# Patient Record
Sex: MALE | Race: BLACK OR AFRICAN AMERICAN | NOT HISPANIC OR LATINO | Employment: UNEMPLOYED | URBAN - METROPOLITAN AREA
[De-identification: names, ages, dates, MRNs, and addresses within clinical notes are randomized per-mention and may not be internally consistent; named-entity substitution may affect disease eponyms.]

---

## 2023-10-31 ENCOUNTER — HOSPITAL ENCOUNTER (EMERGENCY)
Facility: HOSPITAL | Age: 23
Discharge: HOME | End: 2023-10-31
Attending: EMERGENCY MEDICINE
Payer: MEDICAID

## 2023-10-31 VITALS
BODY MASS INDEX: 18.64 KG/M2 | HEIGHT: 66 IN | HEART RATE: 60 BPM | DIASTOLIC BLOOD PRESSURE: 71 MMHG | SYSTOLIC BLOOD PRESSURE: 139 MMHG | TEMPERATURE: 98 F | OXYGEN SATURATION: 99 % | RESPIRATION RATE: 18 BRPM | WEIGHT: 116 LBS

## 2023-10-31 DIAGNOSIS — K08.89 PAIN, DENTAL: Primary | ICD-10-CM

## 2023-10-31 DIAGNOSIS — K04.01 ACUTE PULPITIS: ICD-10-CM

## 2023-10-31 PROCEDURE — 99283 EMERGENCY DEPT VISIT LOW MDM: CPT

## 2023-10-31 RX ORDER — AMOXICILLIN 875 MG/1
875 TABLET, FILM COATED ORAL 2 TIMES DAILY
Qty: 14 TABLET | Refills: 0 | Status: SHIPPED | OUTPATIENT
Start: 2023-10-31 | End: 2024-01-26

## 2023-10-31 NOTE — ED PROVIDER NOTES
Encounter Date: 10/31/2023       History     Chief Complaint   Patient presents with    Oral Swelling     Pain and swelling to the right upper and lower gums with pain into the jaw and right side of the face. Has been an on going issue for years nut symptoms have become more severe over the last 4 days.      23-year-old male with no significant medical history presents to the ED with dental pain.  Patient reports pain to right-sided upper molars that significantly worsened yesterday. He prefers to use natural remedies, but reports clove is not helping. He also tried ibuprofen with no relief of pain. He reports last dental visit about 6 months ago where root canal was recommended, but he is unable to afford the procedure at this time.  He denies any facial swelling, fever, nausea, vomiting.  He is tolerating his secretions.  No difficulty breathing. He did have headache earlier today that has resolved.    The history is provided by the patient.     Review of patient's allergies indicates:  No Known Allergies  No past medical history on file.  No past surgical history on file.  No family history on file.     Review of Systems   Constitutional:  Negative for chills and fever.   HENT:  Positive for dental problem. Negative for ear pain, facial swelling and sinus pain.    Respiratory:  Negative for shortness of breath.    Cardiovascular:  Negative for chest pain.   Skin:  Negative for color change.   Psychiatric/Behavioral:  Negative for agitation and confusion.        Physical Exam     Initial Vitals [10/31/23 1324]   BP Pulse Resp Temp SpO2   139/71 60 18 98.1 °F (36.7 °C) 99 %      MAP       --         Physical Exam    Nursing note and vitals reviewed.  Constitutional: He appears well-developed and well-nourished. He is not diaphoretic.  Non-toxic appearance. No distress.   HENT:   Head: Normocephalic and atraumatic.   Right Ear: Hearing, tympanic membrane, external ear and ear canal normal. Tympanic membrane is not  erythematous and not retracted.   Left Ear: Hearing, tympanic membrane, external ear and ear canal normal. Tympanic membrane is not erythematous and not retracted.   Nose: Nose normal.   Mouth/Throat: Uvula is midline. Dental caries present. No posterior oropharyngeal edema or posterior oropharyngeal erythema.   Upper right molars TTP. Some tooth decay noted. Surrounding gingival inflammation. No visible/palpable abscess/papule. No facial swelling. No induration or crepitus.   Eyes: EOM are normal.   Neck: Neck supple.   Normal range of motion.  Cardiovascular:  Normal rate.           Pulmonary/Chest: No respiratory distress.   Abdominal: He exhibits no distension.   Musculoskeletal:         General: Normal range of motion.      Cervical back: Normal range of motion and neck supple.     Neurological: He is alert and oriented to person, place, and time. GCS score is 15. GCS eye subscore is 4. GCS verbal subscore is 5. GCS motor subscore is 6.   Skin: Skin is dry.   Psychiatric: He has a normal mood and affect. His behavior is normal. Judgment and thought content normal.         ED Course   Procedures  Labs Reviewed - No data to display       Imaging Results    None          Medications - No data to display  Medical Decision Making  24 yo male with upper right dental pain for two days. No severe headache. No facial swelling or fever. Patient well appearing with normal vital signs. Upper right molars TTP, with surrounding gingival inflammation.     Differential Diagnosis includes, but is not limited to:  Randell's angina, acute necrotizing ulcerative gingivitis, epiglottitis, parotitis, gingival abscess, facial cellulitis, peritonsillar/retropharyngeal abscess, sialolithiasis, periapical abscess, pulpitis, dental fracture, dental caries, aphthous ulcer.    Concern for dental infection, though could represent simple dental carry. Antibiotics sent to pharmacy. Pain control rotating ibuprofen and tylenol. Afebrile and well  appearing. No visible/palpable dental abscess or fluctuance. No evidence of deep space infection, Randell's, mastoiditis, ANUG. No facial swelling, trismus or airway involvement. Follow up with dentist. ED return precautions discussed. All questions answered.                                 Clinical Impression:   Final diagnoses:  [K08.89] Pain, dental (Primary)  [K04.01] Acute pulpitis        ED Disposition Condition    Discharge Stable          ED Prescriptions       Medication Sig Dispense Start Date End Date Auth. Provider    amoxicillin (AMOXIL) 875 MG tablet Take 1 tablet (875 mg total) by mouth 2 (two) times daily. 14 tablet 10/31/2023 -- Rachel Hampton PA-C          Follow-up Information       Follow up With Specialties Details Why Contact Info    Dentist  Schedule an appointment as soon as possible for a visit in 1 week               Rachel Hampton PA-C  10/31/23 8575

## 2023-10-31 NOTE — DISCHARGE INSTRUCTIONS
Call your dentist for a follow up appointment. Rotate tylenol and ibuprofen for pain.     Start taking antibiotics as prescribed, and continue taking medication until the entire prescription has been completed.  Avoid using drugs/alcohol while on antibiotics, and for the next 72 hours after finishing the prescription.  Obtain an appointment or return to the ED for any symptoms of worsening infection, including fever, nausea/vomiting or inability to take the medication, antibiotic side effects, allergic reaction, or any other concerns.

## 2023-10-31 NOTE — ED NOTES
Bed: EXAM 18  Expected date:   Expected time:   Means of arrival:   Comments:  Gurpreet after intake

## 2023-10-31 NOTE — Clinical Note
"Ratna Malin" Gurpreet was seen and treated in our emergency department on 10/31/2023.  He may return to work on 11/02/2023.       If you have any questions or concerns, please don't hesitate to call.      Ashwin Melgoza LPN    "

## 2023-10-31 NOTE — ED TRIAGE NOTES
Chronic dental pain x 2 years. States seen by dentist a couple of months ago - was told that he needed a lot of work done but insurance doesn't cover it all. States he is having to get a little done at a time. Presents today with worsening pain in right upper and lower teeth with difficulty chewing and swelling to right side of face x 3-4 days. Denies fever.

## 2023-10-31 NOTE — ED NOTES
Pt states that he is having pain to the right side of his jaw, hx of dental issues. Pt states unable to sleep last night due to pain and is using some home remedies to help with pain, states some relief but not completely. Pt states that his insurance will not cover his dental work right now, states here for relief of pain

## 2024-01-26 ENCOUNTER — OFFICE VISIT (OUTPATIENT)
Dept: URGENT CARE | Facility: CLINIC | Age: 24
End: 2024-01-26
Payer: MEDICAID

## 2024-01-26 VITALS
OXYGEN SATURATION: 98 % | HEIGHT: 66 IN | DIASTOLIC BLOOD PRESSURE: 75 MMHG | BODY MASS INDEX: 18.64 KG/M2 | RESPIRATION RATE: 17 BRPM | TEMPERATURE: 98 F | SYSTOLIC BLOOD PRESSURE: 112 MMHG | WEIGHT: 116 LBS

## 2024-01-26 DIAGNOSIS — K04.7 DENTAL INFECTION: ICD-10-CM

## 2024-01-26 DIAGNOSIS — K08.89 PAIN, DENTAL: Primary | ICD-10-CM

## 2024-01-26 PROCEDURE — 99203 OFFICE O/P NEW LOW 30 MIN: CPT | Mod: S$GLB,,, | Performed by: NURSE PRACTITIONER

## 2024-01-26 RX ORDER — IBUPROFEN 600 MG/1
600 TABLET ORAL EVERY 8 HOURS PRN
Qty: 20 TABLET | Refills: 0 | Status: SHIPPED | OUTPATIENT
Start: 2024-01-26

## 2024-01-26 RX ORDER — AMOXICILLIN AND CLAVULANATE POTASSIUM 875; 125 MG/1; MG/1
1 TABLET, FILM COATED ORAL EVERY 12 HOURS
Qty: 14 TABLET | Refills: 0 | Status: SHIPPED | OUTPATIENT
Start: 2024-01-26 | End: 2024-02-02

## 2024-01-26 NOTE — PROGRESS NOTES
"Subjective:      Patient ID: Ratna Vázquez is a 23 y.o. male.    Vitals:  height is 5' 6" (1.676 m) and weight is 52.6 kg (116 lb). His temperature is 98.4 °F (36.9 °C). His blood pressure is 112/75. His respiration is 17 and oxygen saturation is 98%.     Chief Complaint: Dental Problem    Patient presents to the clinic with right side dental pain x under  year. Patient needs some type of relief from the pain until he can afford the procedure he needs.   Provider note below:  This is a 23 y.o. male who presents today with a chief complaint of right upper dental pain that started yesterday, patient reports he needs to have them pulled out as recommended by his dentist but can not afford the procedure as his insurance does not cover if he needs Cap, denies any active drainage or discharge,  denies fever, body aches or chills, denies cough, wheezing or shortness of breath, denies nausea, vomiting, diarrhea or abdominal pain, denies chest pain or dizziness positional lightheadedness, denies sore throat or trouble swallowing, denies loss of taste or smell, or any other symptoms       Dental Pain   This is a new problem. The current episode started more than 1 month ago. The problem occurs constantly. The problem has been gradually worsening. The pain is at a severity of 9/10. The pain is severe. Associated symptoms include difficulty swallowing, facial pain and thermal sensitivity. Pertinent negatives include no sinus pressure. He has tried nothing for the symptoms. The treatment provided no relief.       HENT:  Positive for dental problem. Negative for sinus pressure.       Objective:     Physical Exam   Constitutional: He is oriented to person, place, and time. He appears well-developed. He is cooperative.  Non-toxic appearance. He does not appear ill. No distress.   HENT:   Head: Normocephalic and atraumatic.   Ears:   Right Ear: Hearing, tympanic membrane, external ear and ear canal normal.   Left Ear: Hearing, " tympanic membrane, external ear and ear canal normal.   Nose: Nose normal. No mucosal edema, rhinorrhea or nasal deformity. No epistaxis. Right sinus exhibits no maxillary sinus tenderness and no frontal sinus tenderness. Left sinus exhibits no maxillary sinus tenderness and no frontal sinus tenderness.   Mouth/Throat: Uvula is midline, oropharynx is clear and moist and mucous membranes are normal. No trismus in the jaw. Normal dentition. No uvula swelling. No oropharyngeal exudate, posterior oropharyngeal edema, posterior oropharyngeal erythema, tonsillar abscesses or cobblestoning.       No visible abscess noted, erythema and tenderness noted to point of pain, no active drainage or discharge noted, no oropharyngeal erythema, edema or exudate noted, uvula midline, no Randell's or trismus      Comments: No visible abscess noted, erythema and tenderness noted to point of pain, no active drainage or discharge noted, no oropharyngeal erythema, edema or exudate noted, uvula midline, no Randell's or trismus  Eyes: Conjunctivae and lids are normal. No scleral icterus.   Neck: Trachea normal and phonation normal. Neck supple. No edema present. No erythema present. No neck rigidity present.   Cardiovascular: Normal rate, regular rhythm, normal heart sounds and normal pulses.   Pulmonary/Chest: Effort normal and breath sounds normal. No respiratory distress. He has no decreased breath sounds. He has no rhonchi.   Abdominal: Normal appearance.   Musculoskeletal: Normal range of motion.         General: No deformity. Normal range of motion.   Neurological: He is alert and oriented to person, place, and time. He exhibits normal muscle tone. Coordination normal.   Skin: Skin is warm, dry, intact, not diaphoretic and not pale.   Psychiatric: His speech is normal and behavior is normal. Judgment and thought content normal.   Nursing note and vitals reviewed.        Patient in no acute distress.  Vitals reassuring.  Discussed  results/diagnosis/plan in depth with patient in clinic. Strict precautions given to patient to monitor for worsening signs and symptoms. Advised to follow up with primary.All questions answered. Strict ER precautions given. If your symptoms worsens or fail to improve you should go to the Emergency Room. Discharge and follow-up instructions given verbally/printed. Discharge and follow-up instructions discussed with the patient who expressed understanding and willingness to comply with my recommendations.Patient voiced understanding and in agreement with current treatment plan.     Please be advised this text was dictated with Unii software and may contain errors due to translation.   Assessment:     1. Pain, dental    2. Dental infection        Plan:       Pain, dental  -     ibuprofen (ADVIL,MOTRIN) 600 MG tablet; Take 1 tablet (600 mg total) by mouth every 8 (eight) hours as needed for Pain.  Dispense: 20 tablet; Refill: 0    Dental infection  -     amoxicillin-clavulanate 875-125mg (AUGMENTIN) 875-125 mg per tablet; Take 1 tablet by mouth every 12 (twelve) hours. for 7 days  Dispense: 14 tablet; Refill: 0                Patient Instructions   PLEASE READ YOUR DISCHARGE INSTRUCTIONS ENTIRELY AS IT CONTAINS IMPORTANT INFORMATION.    Use the mouthwash twice daily    Take the antibiotics to completion    Please see a dentist ASAP for further treatment as the infection can spread to your jaw bone. Ideally in 24-48 hours.    Please return or see your primary care doctor if you develop new or worsening symptoms.     You must understand that you have received an Urgent Care treatment only and that you may be released before all of your medical problems are known or treated.

## 2024-09-27 ENCOUNTER — HOSPITAL ENCOUNTER (EMERGENCY)
Facility: HOSPITAL | Age: 24
Discharge: HOME | End: 2024-09-27
Attending: EMERGENCY MEDICINE

## 2024-09-27 VITALS
DIASTOLIC BLOOD PRESSURE: 60 MMHG | TEMPERATURE: 98 F | WEIGHT: 110 LBS | OXYGEN SATURATION: 98 % | HEIGHT: 66 IN | BODY MASS INDEX: 17.68 KG/M2 | HEART RATE: 79 BPM | RESPIRATION RATE: 16 BRPM | SYSTOLIC BLOOD PRESSURE: 114 MMHG

## 2024-09-27 DIAGNOSIS — K08.89 PAIN, DENTAL: Primary | ICD-10-CM

## 2024-09-27 PROCEDURE — 99284 EMERGENCY DEPT VISIT MOD MDM: CPT

## 2024-09-27 RX ORDER — NAPROXEN 500 MG/1
500 TABLET ORAL
Status: DISCONTINUED | OUTPATIENT
Start: 2024-09-27 | End: 2024-09-27

## 2024-09-27 RX ORDER — NAPROXEN 500 MG/1
500 TABLET ORAL 2 TIMES DAILY
Qty: 60 TABLET | Refills: 0 | Status: SHIPPED | OUTPATIENT
Start: 2024-09-27

## 2024-09-27 RX ORDER — ACETAMINOPHEN 500 MG
1000 TABLET ORAL
Status: DISCONTINUED | OUTPATIENT
Start: 2024-09-27 | End: 2024-09-27 | Stop reason: HOSPADM

## 2024-09-27 RX ORDER — AMOXICILLIN AND CLAVULANATE POTASSIUM 875; 125 MG/1; MG/1
1 TABLET, FILM COATED ORAL 2 TIMES DAILY
Qty: 14 TABLET | Refills: 0 | Status: SHIPPED | OUTPATIENT
Start: 2024-09-27

## 2024-09-27 NOTE — ED PROVIDER NOTES
Encounter Date: 9/27/2024       History     Chief Complaint   Patient presents with    Dental Pain     Patient reports to the ED complaining of left sided dental pain ongoing for the past few weeks. Patient states pain worsened last night. Patient states he took 600mg Ibuprofen PTA. Ambulatory to triage, NAD noted.     24-year-old male with no significant past medical history on file presents to the ED with worsening dental pain with past few days. States he cracked his tooth while eating few weeks ago. Pain is localized to the left upper mandible. Patient denies drooling, trismus, difficulty breathing or swallowing.  Patient denies neck pain, chills, fever.  Had a dose of ibuprofen last night for pain with minimal improvement of his symptoms.  Has not been evaluated by a dentist. States he tried to get dental work couple months ago but had financial problems at the time.  No shortness breath, nausea vomiting.  No other acute complaints today.    The history is provided by the patient.     Review of patient's allergies indicates:  No Known Allergies  No past medical history on file.  No past surgical history on file.  No family history on file.  Social History     Tobacco Use    Smoking status: Never    Smokeless tobacco: Never     Review of Systems   Constitutional:  Negative for chills and fever.   HENT:  Positive for dental problem. Negative for sore throat, trouble swallowing and voice change.    Respiratory:  Negative for shortness of breath.    Cardiovascular:  Negative for chest pain.   Gastrointestinal:  Negative for abdominal distention, abdominal pain, nausea and vomiting.   Musculoskeletal:  Negative for neck pain and neck stiffness.       Physical Exam     Initial Vitals [09/27/24 1324]   BP Pulse Resp Temp SpO2   114/60 79 16 98.3 °F (36.8 °C) 98 %      MAP       --         Physical Exam    Vitals reviewed.  Constitutional: He appears well-developed and well-nourished. He is not diaphoretic. No  distress.   HENT:   Head: Normocephalic and atraumatic.   Right Ear: External ear normal.   Left Ear: External ear normal.   Mouth/Throat: Oropharynx is clear and moist.       Dental: dentition is poor, no sig trismus, floor of the mouth is soft, submandibular area shows no sign's of Randell's angina/spreading infection. No mild soft tissue swelling and induration noted on the left mandible. No overlying cellulitic change or crepitus appreciated on palpation;  No changes in the voice. No uvula deviation or uvulitis. No compromise of his airway.      Eyes: EOM are normal.   Neck: Neck supple.   Normal range of motion.  Cardiovascular:  Normal rate and normal heart sounds.           Pulmonary/Chest: Breath sounds normal. No respiratory distress. He has no wheezes.   Abdominal: Abdomen is soft. Bowel sounds are normal. He exhibits no distension. There is no abdominal tenderness.   Musculoskeletal:      Cervical back: Normal range of motion and neck supple.     Neurological: He is alert and oriented to person, place, and time. GCS score is 15. GCS eye subscore is 4. GCS verbal subscore is 5. GCS motor subscore is 6.   Skin: Skin is warm. Capillary refill takes less than 2 seconds.   Psychiatric: He has a normal mood and affect. His behavior is normal. Judgment and thought content normal.         ED Course   Procedures  Labs Reviewed - No data to display       Imaging Results    None          Medications   acetaminophen tablet 1,000 mg (1,000 mg Oral Not Given 9/27/24 1345)     Medical Decision Making  Differential Diagnosis includes, but is not limited to:  Randell's angina, acute necrotizing ulcerative gingivitis, epiglottitis, parotitis, gingival abscess, facial cellulitis, peritonsillar/retropharyngeal abscess, sialolithiasis, periapical abscess, pulpitis, dental fracture, dental caries, aphthous ulcer.    ED management     24-year-old male with no significant past medical history on file presents to the ED with dental  pain worsening x few weeks.  Patient is not toxic appearing, hemodynamically stable and resting comfortably on bed. Patient is well-appearing.  Awake and alert.  Afebrile with vitals WNL. No distress on exam. On physical exam, dentition is poor, no sig trismus, floor of the mouth is soft, submandibular area shows no sign's of Randell's angina/spreading infection. No obvious overt trauma.  The dental area is tender to chewing.  There is no difficulty speaking, breathing, swallowing.  There has been no recent dental follow up. Based upon the history and physical I see no signs of sublingual swelling, airway compromise, sepsis, or a fluctuant abscess to drain. I believe the patient can be discharged home with Naproxen needed for pain. Will send Rx Augmentin for dental infection prophylaxis.  Encouraged use of warm compresses to alleviate pain and inflammation. Patient should follow-up with [facility] for further evaluation.  Dental resources provided at bedside.  Patient stable at discharge.     I have discussed the specifics of the workup with the patient and the patient has verbalized understanding of the details of the workup, the diagnosis, the treatment plan, and the need for outpatient follow-up with PCP. ED precautions given. Discussed with pt about returning to the ED, if symptoms fail to improve or worsen.     RESULTS:  Documented in ED course.   Labs/ekg interpreted by myself       Voice recognition software utilized in this note. Typographical and content errors may occur with this process. While efforts are made to detect and correct such errors, in some cases errors will persist. For this reason, wording in this document should be considered in the proper context and not strictly verbatim.                      Risk  OTC drugs.  Prescription drug management.                                      Clinical Impression:  Final diagnoses:  [K08.89] Pain, dental (Primary)          ED Disposition Condition    Discharge            ED Prescriptions       Medication Sig Dispense Start Date End Date Auth. Provider    naproxen (NAPROSYN) 500 MG tablet Take 1 tablet (500 mg total) by mouth 2 (two) times daily. 60 tablet 9/27/2024 -- Marcia Gresham PA-C    amoxicillin-clavulanate 875-125mg (AUGMENTIN) 875-125 mg per tablet Take 1 tablet by mouth 2 (two) times daily. 14 tablet 9/27/2024 -- Marcia Gresham PA-C          Follow-up Information       Follow up With Specialties Details Why Contact Info    your dentist  Schedule an appointment as soon as possible for a visit in 3 days As needed, If symptoms worsen              Marcia Gresham PA-C  09/27/24 1429       Marcia Gresham PA-C  09/27/24 1421

## 2024-09-27 NOTE — DISCHARGE INSTRUCTIONS
Mr Vázquez,     Thank you for letting me care for you today! It was nice meeting you, and I hope you feel better soon.   If you would like access to your chart and what was done today please utilize the [facility].   Please don't hesitate to return if your symptoms worsen or you develop any other worrisome symptoms.    Our goal in the emergency department is to always give you outstanding care and exceptional service. You may receive a survey by mail or e-mail in the next week regarding your experience in our ED. We would greatly appreciate you completing and returning the survey. Your feedback provides us with a way to recognize our staff who give very good care and it helps us learn how to improve when your experience was below our aspiration of excellence.     Sincerely,    Marcia Gresham PA-C  Emergency Department Physician Assistant  [facility], [place], and [place]

## 2024-09-27 NOTE — ED TRIAGE NOTES
Patient states that he needs a lot of dental care that he cannot afford. Has been having ongoing dental pain for weeks. Denies fever. Presents in no distress.

## 2025-02-04 ENCOUNTER — HOSPITAL ENCOUNTER (EMERGENCY)
Facility: HOSPITAL | Age: 25
Discharge: HOME | End: 2025-02-04
Attending: EMERGENCY MEDICINE

## 2025-02-04 VITALS
BODY MASS INDEX: 17.68 KG/M2 | DIASTOLIC BLOOD PRESSURE: 74 MMHG | TEMPERATURE: 98 F | HEART RATE: 62 BPM | RESPIRATION RATE: 17 BRPM | HEIGHT: 66 IN | WEIGHT: 110 LBS | OXYGEN SATURATION: 99 % | SYSTOLIC BLOOD PRESSURE: 122 MMHG

## 2025-02-04 DIAGNOSIS — K04.7 DENTAL ABSCESS: Primary | ICD-10-CM

## 2025-02-04 PROCEDURE — 99283 EMERGENCY DEPT VISIT LOW MDM: CPT

## 2025-02-04 RX ORDER — AMOXICILLIN AND CLAVULANATE POTASSIUM 875; 125 MG/1; MG/1
1 TABLET, FILM COATED ORAL 2 TIMES DAILY
Qty: 20 TABLET | Refills: 0 | Status: SHIPPED | OUTPATIENT
Start: 2025-02-04 | End: 2025-02-14

## 2025-02-04 NOTE — ED PROVIDER NOTES
Encounter Date: 2/4/2025       History     Chief Complaint   Patient presents with    Dental Pain     Pt presents to the ED c/o pain to the L side of his mouth r/t broken tooth.     The patient is a 24-year-old male who came to the emergency department with pain and swelling to his left upper gums.  He has a dental abscess, it was treated in November.  He has not followed up with a dentist.      Review of patient's allergies indicates:  No Known Allergies  History reviewed. No pertinent past medical history.  History reviewed. No pertinent surgical history.  No family history on file.  Social History     Tobacco Use    Smoking status: Never    Smokeless tobacco: Never     Review of Systems   All other systems reviewed and are negative.      Physical Exam     Initial Vitals [02/04/25 0539]   BP Pulse Resp Temp SpO2   122/74 62 17 98.1 °F (36.7 °C) 99 %      MAP       --         Physical Exam    Nursing note and vitals reviewed.  Constitutional: He appears well-developed and well-nourished.   HENT:   Head: Normocephalic and atraumatic.   Swelling to the left buccal cheek and tenderness to percussion of an eroded tooth in the upper premolar and molar     Lymphadenopathy:     He has no cervical adenopathy.         ED Course   Procedures  Labs Reviewed - No data to display       Imaging Results    None          Medications - No data to display  Medical Decision Making  Differential Diagnosis includes, but is not limited to:  Randell's angina, acute necrotizing ulcerative gingivitis, epiglottitis, parotitis, gingival abscess, facial cellulitis, peritonsillar/retropharyngeal abscess, sialolithiasis, periapical abscess, pulpitis, dental fracture, dental caries, aphthous ulcer.     MDM:  The patient is an otherwise healthy 24-year-old male with a dental abscess.  He will be started on Augmentin and he needs to follow up with a dentist in 10-14 days.    Risk  Prescription drug management.                               Pt relative called in and states that she did see dentist in regards to Zometa but she needs to have root canal prior to starting Zometa. They will call back with updates.           Clinical Impression:  Final diagnoses:  [K04.7] Dental abscess (Primary)          ED Disposition Condition    Discharge Stable          ED Prescriptions       Medication Sig Dispense Start Date End Date Auth. Provider    amoxicillin-clavulanate 875-125mg (AUGMENTIN) 875-125 mg per tablet Take 1 tablet by mouth 2 (two) times daily. for 10 days 20 tablet 2/4/2025 2/14/2025 Eva Leong MD          Follow-up Information       Follow up With Specialties Details Why Contact Info    Follow-up with dental clinic or your dentist in 1-2 weeks                 Eva Leong MD  02/04/25 7944

## 2025-07-13 ENCOUNTER — HOSPITAL ENCOUNTER (EMERGENCY)
Facility: HOSPITAL | Age: 25
Discharge: HOME | End: 2025-07-13
Attending: EMERGENCY MEDICINE

## 2025-07-13 VITALS
BODY MASS INDEX: 18.33 KG/M2 | RESPIRATION RATE: 20 BRPM | DIASTOLIC BLOOD PRESSURE: 61 MMHG | OXYGEN SATURATION: 98 % | TEMPERATURE: 99 F | WEIGHT: 110 LBS | SYSTOLIC BLOOD PRESSURE: 112 MMHG | HEART RATE: 73 BPM | HEIGHT: 65 IN

## 2025-07-13 DIAGNOSIS — K02.9 PAIN DUE TO DENTAL CARIES: Primary | ICD-10-CM

## 2025-07-13 PROCEDURE — 25000003 PHARM REV CODE 250

## 2025-07-13 PROCEDURE — 99284 EMERGENCY DEPT VISIT MOD MDM: CPT

## 2025-07-13 RX ORDER — IBUPROFEN 400 MG/1
800 TABLET, FILM COATED ORAL
Status: COMPLETED | OUTPATIENT
Start: 2025-07-13 | End: 2025-07-13

## 2025-07-13 RX ORDER — AMOXICILLIN 500 MG/1
500 CAPSULE ORAL EVERY 8 HOURS
Qty: 9 CAPSULE | Refills: 0 | Status: SHIPPED | OUTPATIENT
Start: 2025-07-13 | End: 2025-07-16

## 2025-07-13 RX ORDER — CHLORHEXIDINE GLUCONATE ORAL RINSE 1.2 MG/ML
15 SOLUTION DENTAL 2 TIMES DAILY
Qty: 473 ML | Refills: 0 | Status: SHIPPED | OUTPATIENT
Start: 2025-07-13 | End: 2025-07-29

## 2025-07-13 RX ADMIN — IBUPROFEN 800 MG: 400 TABLET ORAL at 12:07

## 2025-07-13 NOTE — DISCHARGE INSTRUCTIONS
Thank you for letting me care for you today - it was nice to meet you and I hope you feel better soon. Please follow up with dentist as soon as possible. I provided you a list of dentists.     Over the counter: Rotate between 500-1000mg (acetaminophen) Tylenol and 400-600mg ibuprofen (Motrin), switching every 3 hours. For example, acetaminophen at 8am, ibuprofen at 11am, then tylenol at 2pm.  Do not take more than 3000 mg of Tylenol in 1 day or more than 2400 mg of ibuprofen in 1 day.     I sent in the following medication to your pharmacy:   Amoxicillin: 3 times per day for 3 days  Peridex mouthwash: 15 mL, swish in mouth and spit out  Benzocaine swabs:  Apply 1 to area in mouth that is causing you pain. Can use every 6 hours.     Our goal at [facility] is to always give you outstanding care and exceptional service. You may receive a survey by mail or email in the next week about your experience in our ED. We would greatly appreciate you completing and returning the survey. Your feedback provides us with a way to recognize our staff who give very good care and it helps us learn how to improve when your experience was below our aspiration of excellence.     All the best,     Kate Lopez, MPH, PA-C  Emergency Department Physician Assistant  [facility], [facility], [facility] [place]

## 2025-07-13 NOTE — ED PROVIDER NOTES
"Encounter Date: 7/13/2025       History     Chief Complaint   Patient presents with    Dental Pain     C/o posterior upper L tooth pain x1 year.      25 y.o. male presents with dental pain affecting two of his upper left molars X 1 year. States that he "cracked his teeth" approximately 1 year ago. Reports that yesterday he "chewed food on that side", which exacerbated his pain. Patient does not currently have dentist. Has taken no medication for pain relief. Patient has history of dental caries/dental problems.  Denies other problems at this time, including fevers or chills, trismus, drooling, headache, eye problems, ear problems, cough, SOB, CP, n/v, abdominal pain, diarrhea, constipation, urinary problems, joint problems, or rashes.      The history is provided by the patient.     Review of patient's allergies indicates:  No Known Allergies  No past medical history on file.  No past surgical history on file.  No family history on file.  Social History[1]  Review of Systems   Constitutional:  Negative for fever.   HENT:  Positive for dental problem. Negative for drooling, facial swelling, trouble swallowing and voice change.    Musculoskeletal:  Negative for neck pain and neck stiffness.       Physical Exam     Initial Vitals [07/13/25 1147]   BP Pulse Resp Temp SpO2   112/61 73 20 98.5 °F (36.9 °C) 98 %      MAP       --         Physical Exam    Constitutional: He appears well-developed and well-nourished.   HENT:   Head: Normocephalic and atraumatic. Mouth/Throat:       Teeth noted above are decayed and broken. No abscess noted. Floor of mouth is non tender. Tolerating secretions. No facial swelling.      Neurological: He is alert.         ED Course   Procedures  Labs Reviewed - No data to display       Imaging Results    None          Medications   ibuprofen tablet 800 mg (800 mg Oral Given 7/13/25 1218)     Medical Decision Making  Ratna Vázquez is a 25 y.o. male who presents today complaining of dental pain. " The patient notes that they have had poor dentition for quite some time however recently it has become very painful. The patient denies any fevers, chills, nausea, vomiting, diarrhea/dysurea, neck stiffness, photophobia, or any other complaints. The pt is tolerating po's. They present requesting dental clinic information and pain control.    Differential diagnosis includes, but is not limited to: dental caries, pulpitis, dental abscess, Randell's, dental fracture, osteomyelitis     The patient appears to have pulpitis with chronic dental caries.  Based upon the history and physical I see no signs of Randell's angina, sublingual swelling, facial swelling, airway compromise, facial cellulitis, sepsis, dehydration, or a fluctuant abscess to drain.     I believe the patient can be discharged home with antibiotics, peridex, lollicaine. Recommended rotating between tylenol and ibuprofen.  The patient has been given specific return precautions and instructed to follow up with a dentist.    Dental Referral List will be given upon discharge.    The results and physical exam findings were reviewed with the patient. Pt agrees with assessment, disposition and treatment plan and has no further questions or complaints at this time.    Risk  OTC drugs.  Prescription drug management.                                          Clinical Impression:  Final diagnoses:  [K02.9] Pain due to dental caries (Primary)          ED Disposition Condition    Discharge Stable          ED Prescriptions       Medication Sig Dispense Start Date End Date Auth. Provider    benzocaine 20 % GlPk 1 each by Mucous Membrane route every 6 (six) hours. 75 packet 7/13/2025 -- Kate Lopez PA-C    chlorhexidine (PERIDEX) 0.12 % solution Use as directed 15 mLs in the mouth or throat 2 (two) times daily. for 16 days 473 mL 7/13/2025 7/29/2025 Kate Lopez PA-C    amoxicillin (AMOXIL) 500 MG capsule Take 1 capsule (500 mg total) by mouth every 8 (eight) hours. for  3 days 9 capsule 7/13/2025 7/16/2025 Kate Lopez PA-C          Follow-up Information    None                [1]   Social History  Tobacco Use    Smoking status: Never    Smokeless tobacco: Never        Kate Lopez PA-C  07/13/25 1224

## 2025-07-13 NOTE — ED NOTES
Pt presents to ED with C/O L upper back of mouth dental pain 10/10. He denies taking any medication for his symptoms. Pt states he has had a cracked tooth for almost 1 year.